# Patient Record
Sex: FEMALE | Race: WHITE | NOT HISPANIC OR LATINO
[De-identification: names, ages, dates, MRNs, and addresses within clinical notes are randomized per-mention and may not be internally consistent; named-entity substitution may affect disease eponyms.]

---

## 2017-01-20 ENCOUNTER — TRANSCRIPTION ENCOUNTER (OUTPATIENT)
Age: 47
End: 2017-01-20

## 2017-03-13 ENCOUNTER — TRANSCRIPTION ENCOUNTER (OUTPATIENT)
Age: 47
End: 2017-03-13

## 2017-09-19 ENCOUNTER — TRANSCRIPTION ENCOUNTER (OUTPATIENT)
Age: 47
End: 2017-09-19

## 2018-02-08 ENCOUNTER — TRANSCRIPTION ENCOUNTER (OUTPATIENT)
Age: 48
End: 2018-02-08

## 2018-03-13 ENCOUNTER — TRANSCRIPTION ENCOUNTER (OUTPATIENT)
Age: 48
End: 2018-03-13

## 2018-08-05 ENCOUNTER — TRANSCRIPTION ENCOUNTER (OUTPATIENT)
Age: 48
End: 2018-08-05

## 2019-01-10 ENCOUNTER — APPOINTMENT (OUTPATIENT)
Dept: PHYSICAL MEDICINE AND REHAB | Facility: CLINIC | Age: 49
End: 2019-01-10
Payer: COMMERCIAL

## 2019-01-10 VITALS
HEIGHT: 67 IN | RESPIRATION RATE: 14 BRPM | OXYGEN SATURATION: 98 % | SYSTOLIC BLOOD PRESSURE: 110 MMHG | HEART RATE: 72 BPM | DIASTOLIC BLOOD PRESSURE: 74 MMHG | BODY MASS INDEX: 23.54 KG/M2 | WEIGHT: 150 LBS

## 2019-01-10 DIAGNOSIS — S06.0X9A CONCUSSION WITH LOSS OF CONSCIOUSNESS OF UNSPECIFIED DURATION, INITIAL ENCOUNTER: ICD-10-CM

## 2019-01-10 DIAGNOSIS — G44.209 TENSION-TYPE HEADACHE, UNSPECIFIED, NOT INTRACTABLE: ICD-10-CM

## 2019-01-10 PROCEDURE — 99204 OFFICE O/P NEW MOD 45 MIN: CPT

## 2019-01-14 NOTE — HISTORY OF PRESENT ILLNESS
[FreeTextEntry1] : 49 yo F. presents for initial eval. for concussion 12/31/18 after fell on ice while on vacation in [place], [place]. did not hit head but she got whiplashed.  +dazed confused.  No LOC, no amnesia.  \par That day was More fatigued, Not herself.\par  \par Went to ER next day as she felt worse.  +headache, neck pain. CT head--neg.  Tried skiing Roger. 3 and could not tolerate more than 5 min. \par \par Symptoms have improved. but triggered with exertion or too much stimulation\par \par worse symptoms--light sensitivity, and feels vision in left eye is blurry and more sensitive to light. \par Fatigue, sensitivity to noise,  irritability. feeling like in a "fog"\par \par Headache--only when overstimulated or with exertion.  tried to walk her dog 2 days ago and developed 3/10 throbbing headache.  She developed a severe sharp shooting "migraine" headache when having an orgasm. Has not reoccurred but is fearful of having that kind of pain again with activity. \par \par neck pain improved.  intermittent.  Going for chiropractor treatments and stretching which help significantly. \par \par No significant dizziness. \par \par Concussion symptom score--30\par \par Concussion--2 years ago--after fall on ice--severe headache, visual symptoms, was more severe than this concussion. Close to 3 months to recover\par \par PSH--C. Section\par \par Soc hx: no smoking, and ETOH \par  for [employer]--works in city\par Graduate Education in international environmental policy. \par GPA--3.8\par --has 2 boys--ages 11 & 13---very involved with taking care of them and their school activities which involves driving them to practices etc. \par Has cooking responsibility at home.  \par \par Her  currently has the flu so cannot help her as much at home. \par \par She is self -employed and has not taken on clients since her injury.  \par \par No family hx of headaches/migraines, depression, anxiety\par

## 2019-01-14 NOTE — PHYSICAL EXAM
[FreeTextEntry1] : Constitutional: Alert, awake, no acute distress\par HEENT: EOMI, NC/AT, neck supple\par Cardiovascular: All extremities warm and well perfused\par Pulmonary: No respiratory distress, normal chest wall expansion\par Gastrointestinal: No abdominal distention\par \par Neuro: \par AAOx3, \par \par Recall 4/5 spontaneously., 5/5 with cues. \par Attention--able to complete MOYB, and serial 7's\par \par \par CN: intact no nystagmus, visual fields intact, PERRLA, EOMI, no facial weakness or sensory deficit, shoulder corinne intact, tongue midline\par VOR testing--no nystagmus\par near far accommodation--elicits  mild blurred vision\par focusing on stationary thumb with head rotation in horizontal or vertical plane--reproduces mild visual change\par Motor 5/5 bilat UE and LE\par Sens intact bilat UE and LE\par Coordination intact--FNF and HTS intact\par Gait normal\par Balance: able to perform tandem gait \par \par \par MSK:\par No cervical tenderness, no para cervical tenderness\par Cervical range of motion within normal limits, \par Tinels tapping  on  upper cervical elicits does not reproduce symptoms.\par \par \par \par \par

## 2019-01-14 NOTE — ASSESSMENT
[FreeTextEntry1] : Pt.  counselled at length on symptoms and pathology of concussion, recovery course and expected prognosis. Patient symptoms have been improving and reassured that she she will make a full recovery. Counseled on continuing to gradually increase her her activities allowing symptoms to be a guide, allowing herself extra time and rest breaks during a task and limiting stimulation. \par \par --remain out of work for now and cont. to focus on home life.  \par \par --May take OTC pain meds. \par Pt. with anxiety that may get another intense headache/migraine as she did earlier---Rx few doses of "rescue" triptan in case, but explained unlikely for reoccurance. \par \par

## 2019-03-10 ENCOUNTER — TRANSCRIPTION ENCOUNTER (OUTPATIENT)
Age: 49
End: 2019-03-10

## 2019-04-25 ENCOUNTER — TRANSCRIPTION ENCOUNTER (OUTPATIENT)
Age: 49
End: 2019-04-25

## 2019-06-12 ENCOUNTER — TRANSCRIPTION ENCOUNTER (OUTPATIENT)
Age: 49
End: 2019-06-12

## 2019-07-15 ENCOUNTER — TRANSCRIPTION ENCOUNTER (OUTPATIENT)
Age: 49
End: 2019-07-15

## 2019-09-23 ENCOUNTER — TRANSCRIPTION ENCOUNTER (OUTPATIENT)
Age: 49
End: 2019-09-23

## 2019-10-03 ENCOUNTER — APPOINTMENT (OUTPATIENT)
Dept: OTOLARYNGOLOGY | Facility: CLINIC | Age: 49
End: 2019-10-03
Payer: COMMERCIAL

## 2019-10-03 VITALS
WEIGHT: 155 LBS | SYSTOLIC BLOOD PRESSURE: 110 MMHG | BODY MASS INDEX: 24.33 KG/M2 | HEART RATE: 81 BPM | DIASTOLIC BLOOD PRESSURE: 70 MMHG | HEIGHT: 67 IN | OXYGEN SATURATION: 98 %

## 2019-10-03 DIAGNOSIS — H92.02 OTALGIA, LEFT EAR: ICD-10-CM

## 2019-10-03 DIAGNOSIS — Z82.49 FAMILY HISTORY OF ISCHEMIC HEART DISEASE AND OTHER DISEASES OF THE CIRCULATORY SYSTEM: ICD-10-CM

## 2019-10-03 DIAGNOSIS — Z78.9 OTHER SPECIFIED HEALTH STATUS: ICD-10-CM

## 2019-10-03 DIAGNOSIS — Z87.09 PERSONAL HISTORY OF OTHER DISEASES OF THE RESPIRATORY SYSTEM: ICD-10-CM

## 2019-10-03 PROCEDURE — 99203 OFFICE O/P NEW LOW 30 MIN: CPT

## 2019-10-04 PROBLEM — Z78.9 NO PERTINENT PAST MEDICAL HISTORY: Status: RESOLVED | Noted: 2019-10-04 | Resolved: 2019-10-04

## 2019-10-04 PROBLEM — Z82.49 FAMILY HISTORY OF ATRIAL FIBRILLATION: Status: ACTIVE | Noted: 2019-10-03

## 2019-10-04 PROBLEM — H92.02 OTALGIA OF LEFT EAR: Status: ACTIVE | Noted: 2019-10-04

## 2019-10-04 RX ORDER — BUPROPION HYDROCHLORIDE 300 MG/1
300 TABLET, EXTENDED RELEASE ORAL
Refills: 0 | Status: ACTIVE | COMMUNITY

## 2019-10-04 RX ORDER — CHROMIUM 200 MCG
1000 TABLET ORAL
Refills: 0 | Status: ACTIVE | COMMUNITY

## 2019-10-04 NOTE — CONSULT LETTER
[Dear  ___] : Dear  [unfilled], [Consult Letter:] : I had the pleasure of evaluating your patient, [unfilled]. [Please see my note below.] : Please see my note below. [Sincerely,] : Sincerely, [Consult Closing:] : Thank you very much for allowing me to participate in the care of this patient.  If you have any questions, please do not hesitate to contact me. [FreeTextEntry2] : Tamara Amezcua MD [FreeTextEntry3] : Daniel López MD, FACS\par Clinical \par Dept. of Otolaryngology\par [facility]\par

## 2019-10-04 NOTE — ASSESSMENT
[FreeTextEntry1] : Pt has no evidence of ear pathology.  Sinusitis appears to be resolving with the Augmentin.  She was advised to finish the Augmentin.  If the otalgia persists she may require a tympanogram to assess for eustachian tube dysfunction.

## 2019-10-04 NOTE — HISTORY OF PRESENT ILLNESS
[de-identified] : 48 y/o F with a recent URI complicated by sinusitis over the past 2 weeks.  Pt seen in Urgent Care and was started on Augmentin.  She is now feeling better, but is c/o L otalgia.

## 2019-10-04 NOTE — PHYSICAL EXAM
[de-identified] : Mild residual erythema [Midline] : trachea located in midline position [Normal] : no rashes

## 2019-10-04 NOTE — REVIEW OF SYSTEMS
[Seasonal Allergies] : seasonal allergies [Post Nasal Drip] : post nasal drip [Ear Pain] : ear pain [Ear Itch] : ear itch [Ear Drainage] : ear drainage [Recurrent Sinus Infections] : recurrent sinus infections [Sinus Pain] : sinus pain [Sense Of Smell Problem] : sense of smell problem [Sinus Pressure] : sinus pressure [Discolored Nasal Discharge] : discolored nasal discharge [Throat Clearing] : throat clearing [Throat Pain] : throat pain [Eye Pain] : eye pain [Cough] : cough [Swollen Glands] : swollen glands [Negative] : Psychiatric [Sneezing] : no sneezing [Hearing Loss] : no hearing loss [Vertigo] : no vertigo [Recurrent Ear Infections] : no recurrent ear infections [Lightheadedness] : no lightheadedness [Ear Noises] : no ear noises [Nasal Congestion] : no nasal congestion [Nose Bleeds] : no nose bleeds [Problem Snoring] : no snoring problems [Snoring With Pauses] : no snoring with pauses [Hoarseness] : no hoarseness [Throat Dryness] : no throat dryness [Throat Itching] : no throat itching [Swelling Neck] : no neck swelling [Swelling Face] : no face swelling [Fever] : no fever [Chills] : no chills [Feeling Poorly] : not feeling poorly [Feeling Tired] : not feeling tired [Recent Weight Gain (___ Lbs)] : no recent weight gain [Recent Weight Loss (___ Lbs)] : no recent weight loss [Red Eyes] : eyes not red [Eyesight Problems] : no eyesight problems [Dry Eyes] : no dry eyes [Discharge From Eyes] : no purulent discharge from the eyes [Eyes Itch] : no itching of the eyes [Heart Rate Is Fast] : the heart rate was not fast [Heart Rate Is Slow] : the heart rate was not slow [Chest Pain] : no chest pain [Palpitations] : no palpitations [Leg Claudication] : no intermittent leg claudication [Lower Ext Edema] : no lower extremity edema [Shortness Of Breath] : no shortness of breath [SOB on Exertion] : no shortness of breath during exertion [Wheezing] : no wheezing [Orthopnea] : no orthopnea [PND] : no PND [Vomiting] : no vomiting [Abdominal Pain] : no abdominal pain [Constipation] : no constipation [Diarrhea] : no diarrhea [Heartburn] : no heartburn [Melena] : no melena [Incontinence] : no incontinence [Dysuria] : no dysuria [Pelvic Pain] : no pelvic pain [Vaginal Discharge] : no vaginal discharge [Dysmenorrhea] : no dysmenorrhea [Abn Vaginal Bleeding] : no unexplained vaginal bleeding [Arthralgias] : no arthralgias [Joint Stiffness] : no joint stiffness [Joint Pain] : no joint pain [Joint Swelling] : no joint swelling [Limb Pain] : no limb pain [Limb Swelling] : no limb swelling [Skin Wound] : no skin wound [Skin Lesions] : no skin lesions [Itching] : no itching [Change In A Mole] : no change in a mole [Breast Pain] : no breast pain [Breast Lump] : no breast lump [Confusion] : no confusion [Dizziness] : no dizziness [Convulsions] : no convulsions [Fainting] : no fainting [Limb Weakness] : no limb weakness [Difficulty Walking] : no difficulty walking [Sleep Disturbances] : no sleep disturbances [Suicidal] : not suicidal [Anxiety] : no anxiety [Depression] : no depression [Change In Personality] : no personality change [Emotional Problems] : no emotional problems [Proptosis] : no proptosis [Hot Flashes] : no hot flashes [Muscle Weakness] : no muscle weakness [Deepening Of The Voice] : no deepening of the voice [Feelings Of Weakness] : no feelings of weakness [Easy Bleeding] : no tendency for easy bleeding [Easy Bruising] : no tendency for easy bruising [Swollen Glands In The Neck] : no swollen glands in the neck [de-identified] : Headache [FreeTextEntry2] : Fatigue, Daytime Sleepiness [de-identified] : Feels cooler than others

## 2019-11-05 ENCOUNTER — TRANSCRIPTION ENCOUNTER (OUTPATIENT)
Age: 49
End: 2019-11-05

## 2019-12-03 ENCOUNTER — TRANSCRIPTION ENCOUNTER (OUTPATIENT)
Age: 49
End: 2019-12-03

## 2020-01-12 ENCOUNTER — TRANSCRIPTION ENCOUNTER (OUTPATIENT)
Age: 50
End: 2020-01-12

## 2020-01-13 ENCOUNTER — APPOINTMENT (OUTPATIENT)
Dept: OTOLARYNGOLOGY | Facility: CLINIC | Age: 50
End: 2020-01-13
Payer: COMMERCIAL

## 2020-01-13 ENCOUNTER — APPOINTMENT (OUTPATIENT)
Dept: PULMONOLOGY | Facility: CLINIC | Age: 50
End: 2020-01-13

## 2020-01-13 VITALS
BODY MASS INDEX: 25.11 KG/M2 | SYSTOLIC BLOOD PRESSURE: 118 MMHG | HEIGHT: 67 IN | OXYGEN SATURATION: 98 % | TEMPERATURE: 98.5 F | WEIGHT: 160 LBS | HEART RATE: 84 BPM | DIASTOLIC BLOOD PRESSURE: 70 MMHG | RESPIRATION RATE: 18 BRPM

## 2020-01-13 DIAGNOSIS — Z87.09 PERSONAL HISTORY OF OTHER DISEASES OF THE RESPIRATORY SYSTEM: ICD-10-CM

## 2020-01-13 DIAGNOSIS — R05 COUGH: ICD-10-CM

## 2020-01-13 PROCEDURE — 99214 OFFICE O/P EST MOD 30 MIN: CPT

## 2020-01-13 RX ORDER — AMOXICILLIN AND CLAVULANATE POTASSIUM 875; 125 MG/1; MG/1
875-125 TABLET, COATED ORAL
Qty: 20 | Refills: 0 | Status: COMPLETED | COMMUNITY
Start: 2019-09-27

## 2020-01-13 RX ORDER — AZITHROMYCIN 250 MG/1
250 TABLET, FILM COATED ORAL
Qty: 6 | Refills: 0 | Status: COMPLETED | COMMUNITY
Start: 2019-11-12

## 2020-01-13 NOTE — PHYSICAL EXAM
[Midline] : trachea located in midline position [de-identified] : Mild residual erythema [de-identified] : Mild posterior pharyngeal erythema [Normal] : auscultation of heart is normal

## 2020-01-13 NOTE — CONSULT LETTER
[Courtesy Letter:] : I had the pleasure of seeing your patient, [unfilled], in my office today. [Dear  ___] : Dear  [unfilled], [Please see my note below.] : Please see my note below. [Sincerely,] : Sincerely, [Consult Closing:] : Thank you very much for allowing me to participate in the care of this patient.  If you have any questions, please do not hesitate to contact me. [FreeTextEntry2] : Tamara Amezcua MD [FreeTextEntry3] : Daniel López MD, FACS\par Clinical \par Dept. of Otolaryngology and Head & Neck Surgery\par [facility]\par

## 2020-01-13 NOTE — REVIEW OF SYSTEMS
[Post Nasal Drip] : post nasal drip [Fever] : fever [Cough] : cough [Negative] : Psychiatric [de-identified] : No significant nasal symptoms

## 2020-02-07 ENCOUNTER — TRANSCRIPTION ENCOUNTER (OUTPATIENT)
Age: 50
End: 2020-02-07

## 2020-02-11 ENCOUNTER — APPOINTMENT (OUTPATIENT)
Dept: INTERNAL MEDICINE | Facility: CLINIC | Age: 50
End: 2020-02-11

## 2020-12-21 PROBLEM — Z87.09 HISTORY OF ACUTE SINUSITIS: Status: RESOLVED | Noted: 2019-10-04 | Resolved: 2020-12-21

## 2021-03-08 ENCOUNTER — APPOINTMENT (OUTPATIENT)
Dept: FAMILY MEDICINE | Facility: CLINIC | Age: 51
End: 2021-03-08
Payer: COMMERCIAL

## 2021-03-08 DIAGNOSIS — F41.9 ANXIETY DISORDER, UNSPECIFIED: ICD-10-CM

## 2021-03-08 DIAGNOSIS — Z00.00 ENCOUNTER FOR GENERAL ADULT MEDICAL EXAMINATION W/OUT ABNORMAL FINDINGS: ICD-10-CM

## 2021-03-08 PROCEDURE — 99203 OFFICE O/P NEW LOW 30 MIN: CPT | Mod: 95

## 2021-03-08 RX ORDER — AMOXICILLIN AND CLAVULANATE POTASSIUM 500; 125 MG/1; 1/1
TABLET, FILM COATED ORAL
Refills: 0 | Status: DISCONTINUED | COMMUNITY
End: 2021-03-08

## 2021-03-08 RX ORDER — BUPROPION HYDROCHLORIDE 150 MG/1
150 TABLET, EXTENDED RELEASE ORAL
Refills: 0 | Status: DISCONTINUED | COMMUNITY
End: 2021-03-08

## 2021-03-08 RX ORDER — BENZONATATE 200 MG/1
200 CAPSULE ORAL
Qty: 21 | Refills: 0 | Status: DISCONTINUED | COMMUNITY
Start: 2020-01-11 | End: 2021-03-08

## 2021-03-08 RX ORDER — HYDROCODONE BITARTRATE AND ACETAMINOPHEN 7.5; 325 MG/1; MG/1
7.5-325 TABLET ORAL
Qty: 4 | Refills: 0 | Status: DISCONTINUED | COMMUNITY
Start: 2020-09-18 | End: 2021-03-08

## 2021-03-08 RX ORDER — METFORMIN HYDROCHLORIDE 500 MG/1
500 TABLET, COATED ORAL
Qty: 60 | Refills: 0 | Status: ACTIVE | COMMUNITY
Start: 2021-02-16

## 2021-03-08 RX ORDER — SUMATRIPTAN 25 MG/1
25 TABLET, FILM COATED ORAL
Qty: 5 | Refills: 0 | Status: DISCONTINUED | COMMUNITY
Start: 2019-01-10 | End: 2021-03-08

## 2021-03-08 NOTE — HISTORY OF PRESENT ILLNESS
[Home] : at home, [unfilled] , at the time of the visit. [Medical Office: ([facility])___] : at the medical office located in  [Verbal consent obtained from patient] : the patient, [unfilled] [FreeTextEntry8] : to establish care\par patient moved from [place] a few years ago and would like to estalbish care here on [place]\par states she was recently started on metfromin for prediabetes/\par takes wellbutrin for anxiety, she has been on for years\par no medical sissues. has gyn. obtains yearly mammograms in city which she reports as normal\par has 2 teenage sons\par had coviid in january, still has mild headaches. would like antibody testing, reports negative antibodies last month

## 2021-03-08 NOTE — ASSESSMENT
[FreeTextEntry1] : patient with history of covid earlier this year, fully recovered with lingering mild headaches\par reassurance\par will obtain routine blood work and patient diesires antibody testing\par follow up for cpe

## 2021-06-03 ENCOUNTER — TRANSCRIPTION ENCOUNTER (OUTPATIENT)
Age: 51
End: 2021-06-03

## 2021-08-09 ENCOUNTER — APPOINTMENT (OUTPATIENT)
Dept: FAMILY MEDICINE | Facility: CLINIC | Age: 51
End: 2021-08-09

## 2021-10-20 ENCOUNTER — TRANSCRIPTION ENCOUNTER (OUTPATIENT)
Age: 51
End: 2021-10-20

## 2021-11-01 ENCOUNTER — APPOINTMENT (OUTPATIENT)
Dept: OTOLARYNGOLOGY | Facility: CLINIC | Age: 51
End: 2021-11-01
Payer: COMMERCIAL

## 2021-11-01 VITALS
WEIGHT: 165 LBS | DIASTOLIC BLOOD PRESSURE: 80 MMHG | HEIGHT: 67 IN | BODY MASS INDEX: 25.9 KG/M2 | TEMPERATURE: 97.2 F | HEART RATE: 73 BPM | OXYGEN SATURATION: 98 % | SYSTOLIC BLOOD PRESSURE: 116 MMHG

## 2021-11-01 PROCEDURE — 99213 OFFICE O/P EST LOW 20 MIN: CPT

## 2021-11-01 NOTE — HISTORY OF PRESENT ILLNESS
[No] : patient does not have a  history of radiation therapy [de-identified] : 51 year old female with two weeks of left ear pain. She went to urgent care on 10/16 with sore throat/sinus. At [facility], and there she was told that she had a bump/pimple in her ear canal. She had severe ear pain and bleeding which brought her into the urgent care. She recently then had a pneumonia, bronchitis and took a full course of augmentin. She is now better. But her ear still hurts. She had a laser procedure and was recently had steroids, and since then she has been having worsening pain. She has had left tinnitus. She denies vertigo. She denies mastoid pain but has pain behind the ear lobe. \par \par She is a chronic teeth , has mouth guard but does not use. [Ear Fullness] : ear fullness

## 2021-11-01 NOTE — ASSESSMENT
[FreeTextEntry1] : 51 year old female with left ear pain. Possible due to a cyst previously, but this is no longer present. 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 Recc mouth guard, soft diet, warm compresses, nsaids. If this does not help, will consider botox injection.

## 2021-11-01 NOTE — REASON FOR VISIT
[Initial Evaluation] : an initial evaluation for [Ear Pain] : ear pain [FreeTextEntry2] : 51 year old female with left ear pain

## 2021-11-01 NOTE — PHYSICAL EXAM
[de-identified] : bilateral clicking [Normal] : tympanic membranes are normal in both ears [de-identified] : left dried blood, otherwise normal EAC

## 2021-11-19 ENCOUNTER — EMERGENCY (EMERGENCY)
Facility: HOSPITAL | Age: 51
LOS: 1 days | Discharge: HOME | End: 2021-11-19
Attending: EMERGENCY MEDICINE
Payer: COMMERCIAL

## 2021-11-19 VITALS
TEMPERATURE: 98 F | HEART RATE: 70 BPM | OXYGEN SATURATION: 98 % | RESPIRATION RATE: 16 BRPM | SYSTOLIC BLOOD PRESSURE: 123 MMHG | DIASTOLIC BLOOD PRESSURE: 78 MMHG

## 2021-11-19 VITALS
TEMPERATURE: 98 F | OXYGEN SATURATION: 99 % | SYSTOLIC BLOOD PRESSURE: 131 MMHG | RESPIRATION RATE: 16 BRPM | DIASTOLIC BLOOD PRESSURE: 86 MMHG | HEART RATE: 90 BPM | WEIGHT: 164.91 LBS | HEIGHT: 67.5 IN

## 2021-11-19 PROCEDURE — 71046 X-RAY EXAM CHEST 2 VIEWS: CPT

## 2021-11-19 PROCEDURE — 93971 EXTREMITY STUDY: CPT | Mod: 26,LT

## 2021-11-19 PROCEDURE — 93971 EXTREMITY STUDY: CPT

## 2021-11-19 PROCEDURE — 99284 EMERGENCY DEPT VISIT MOD MDM: CPT

## 2021-11-19 PROCEDURE — 71046 X-RAY EXAM CHEST 2 VIEWS: CPT | Mod: 26

## 2021-11-19 PROCEDURE — 99284 EMERGENCY DEPT VISIT MOD MDM: CPT | Mod: 25

## 2021-11-19 NOTE — ED PROVIDER NOTE - PROGRESS NOTE DETAILS
[employer]:  pt signed out to me pending Duplex read.  results noted.  pt has discharge paperwork.  will DC

## 2021-11-19 NOTE — ED ADULT NURSE NOTE - OBJECTIVE STATEMENT
51 year old female presents to the ED via walk in with c/o left sided lymph node enlargement s/p covid booster. Pt received pfizer booster on 11/16/21, and then began to notice swelling to left axilla and left neck with mild discomfort to area, pain is not severe enough for patient to want pain medication. Pt had no prior reaction to other 2 vaccines. Denies any s/s fever/chills/N/V/D. Comfort and safety measures maintained.

## 2021-11-19 NOTE — ED PROVIDER NOTE - PHYSICAL EXAMINATION
A&Ox3, NAD.  NCAT. PERRL, EOMI.  Neck supple, + LED to L. axilla and clavicular LAD. No crepitus, nontender  Lungs CTAB. No w/r/r  Cardiac +S1S2, RRR, No m/r/g.   Abd soft, NT/ND, +BS, no rebound or guarding.   Extremities: cap refill <2, pulses in distal extremities 4+, no edema.   Skin without rash.   CN II-XII intact. Strength 5/5 UE/LE. Sensations intact throughout. Gait steady.

## 2021-11-19 NOTE — ED PROVIDER NOTE - NS ED ROS FT
Constitutional: No fever or chills  CV: No chest pain or lower extremity edema  Resp: No SOB no cough  GI: No abd pain. No nausea or vomiting. No diarrhea. No constipation.   : No dysuria, hematuria.   MSK: No musculoskeletal pain  Skin: see hpi  Neuro: No headache. No numbness or tingling. No weakness.

## 2021-11-19 NOTE — ED PROVIDER NOTE - ATTENDING CONTRIBUTION TO CARE
------------ATTENDING NOTE------------  pt c/o gradually swelling in L axilla and L chest/neck over past 72 hrs since 3rd COVID vaccine, no fevers, no redness or infectious changes to skin, no additional allergic symptoms (hives, urticaria, angioedema, stridor, cough, wheezing, GI symptoms), exam w/ mild tender mobile lymphadenopathy, HUBER has +FROM 5/5 nvi w/ bcr distally, clear chest wo distress, no JVD/bruits, likely all reactive lymph nodes, pt very well appearing, awaiting imaging and close reassessments -->  - Blayne Coyle MD   --------------------------------------------- ------------ATTENDING NOTE------------  pt c/o gradually swelling in L axilla and L chest/neck over past 72 hrs since 3rd COVID vaccine, no fevers, no redness or infectious changes to skin, no additional allergic symptoms (hives, urticaria, angioedema, stridor, cough, wheezing, GI symptoms), exam w/ mild tender mobile lymphadenopathy, LUE has +FROM 5/5 nvi w/ bcr distally, clear chest wo distress, no JVD/bruits, likely all reactive lymph nodes, pt very well appearing, awaiting imaging and close reassessments --> remained stable, pt describes area as decreasing over past day, imaging wnl, likely reactive lymphadenopathy, nml VS at IL, in depth dw pt about ddx, tx, roblero, continued close outpt fu.  - Blayne Coyle MD   ---------------------------------------------

## 2021-11-19 NOTE — ED PROVIDER NOTE - NSFOLLOWUPINSTRUCTIONS_ED_ALL_ED_FT
See your Primary Doctor this week for follow up -- call to discuss.    Stay well hydrated, eat regular healthy meals, get plenty of rest, continue current medications.    See LYMPHADENOPATHY information and return instructions given to you.    Seek immediate medical care for new/worsening symptoms/concerns.

## 2021-11-19 NOTE — ED ADULT TRIAGE NOTE - CHIEF COMPLAINT QUOTE
had booster shot on tuesday morning; swelling to left clavicle and underarm and pectoral; swelling radiating up neck; now tingling down arm

## 2021-11-19 NOTE — ED PROVIDER NOTE - PATIENT PORTAL LINK FT
You can access the FollowMyHealth Patient Portal offered by [facility] by registering at the following website: [email or web address]. By joining [employer]’s FollowMyHealth portal, you will also be able to view your health information using other applications (apps) compatible with our system.

## 2021-11-19 NOTE — ED PROVIDER NOTE - OBJECTIVE STATEMENT
50 yo female with no sig pmh here for c/o L. axillary swelling s.o pfizer covid vaccine booster 3 days ago. Pt has noted his 52 yo female with no sig pmh here for c/o L. axillary swelling s.o pfizer covid vaccine booster 3 days ago. Pt has noted she started to have swelling up her neck and her chest so came in for worsening swelling. Pt notes swelling is improved today compared to yesterday, she spoke with her pcp and recommended ED eval to r.o DVT. Pt denies sob, difficulty breathing no cp, no difficulty breathing.

## 2022-01-06 ENCOUNTER — APPOINTMENT (OUTPATIENT)
Dept: OTOLARYNGOLOGY | Facility: CLINIC | Age: 52
End: 2022-01-06

## 2022-02-02 NOTE — HISTORY OF PRESENT ILLNESS
[de-identified] : 48 y/o F with a h/o low grade fever, PND, cough for the past 5 days.  Pt's son has the flu.  SHe has had the swab x 2 at walk-in clinics and was negative.  Despite the negative testing the pt was given Tamiflu. She took it for 2 days and then stopped it as it upset her stomach.  She took a dose of her son's steroids and is helped temporarily.   She was also tested for Strep and the rapid test was negative.  Pt believes her symptoms may be improving.  \par \par She is scheduled to travel to [facility] and will be in a remote area during her stay.  If her symptoms persist she will call and I will prescribe Augmentin and a short course of steroids for her to take, if needed.  
none

## 2022-03-11 ENCOUNTER — TRANSCRIPTION ENCOUNTER (OUTPATIENT)
Age: 52
End: 2022-03-11

## 2022-06-11 ENCOUNTER — NON-APPOINTMENT (OUTPATIENT)
Age: 52
End: 2022-06-11

## 2023-02-25 ENCOUNTER — NON-APPOINTMENT (OUTPATIENT)
Age: 53
End: 2023-02-25

## 2023-05-10 ENCOUNTER — OUTPATIENT (OUTPATIENT)
Dept: OUTPATIENT SERVICES | Facility: HOSPITAL | Age: 53
LOS: 1 days | End: 2023-05-10
Payer: COMMERCIAL

## 2023-05-10 ENCOUNTER — APPOINTMENT (OUTPATIENT)
Dept: RADIOLOGY | Facility: HOSPITAL | Age: 53
End: 2023-05-10
Payer: COMMERCIAL

## 2023-05-10 DIAGNOSIS — M25.561 PAIN IN RIGHT KNEE: ICD-10-CM

## 2023-05-10 PROCEDURE — 73564 X-RAY EXAM KNEE 4 OR MORE: CPT

## 2023-05-10 PROCEDURE — 73564 X-RAY EXAM KNEE 4 OR MORE: CPT | Mod: 26,RT

## 2023-08-26 ENCOUNTER — NON-APPOINTMENT (OUTPATIENT)
Age: 53
End: 2023-08-26

## 2024-09-29 ENCOUNTER — NON-APPOINTMENT (OUTPATIENT)
Age: 54
End: 2024-09-29

## 2024-10-02 ENCOUNTER — APPOINTMENT (OUTPATIENT)
Dept: CT IMAGING | Facility: HOSPITAL | Age: 54
End: 2024-10-02
Payer: COMMERCIAL

## 2024-10-02 PROCEDURE — 70450 CT HEAD/BRAIN W/O DYE: CPT | Mod: 26

## 2024-12-01 ENCOUNTER — NON-APPOINTMENT (OUTPATIENT)
Age: 54
End: 2024-12-01

## 2025-02-14 ENCOUNTER — NON-APPOINTMENT (OUTPATIENT)
Age: 55
End: 2025-02-14